# Patient Record
Sex: FEMALE | Race: WHITE | NOT HISPANIC OR LATINO | Employment: UNEMPLOYED | URBAN - METROPOLITAN AREA
[De-identification: names, ages, dates, MRNs, and addresses within clinical notes are randomized per-mention and may not be internally consistent; named-entity substitution may affect disease eponyms.]

---

## 2017-02-01 ENCOUNTER — OFFICE VISIT (OUTPATIENT)
Dept: NEUROLOGY | Facility: CLINIC | Age: 62
End: 2017-02-01

## 2017-02-01 VITALS — RESPIRATION RATE: 16 BRPM | BODY MASS INDEX: 22.5 KG/M2 | WEIGHT: 140 LBS | HEIGHT: 66 IN

## 2017-02-01 DIAGNOSIS — G40.219 PARTIAL SYMPTOMATIC EPILEPSY WITH COMPLEX PARTIAL SEIZURES, INTRACTABLE, WITHOUT STATUS EPILEPTICUS (HCC): Primary | ICD-10-CM

## 2017-02-01 PROCEDURE — 99213 OFFICE O/P EST LOW 20 MIN: CPT | Performed by: PSYCHIATRY & NEUROLOGY

## 2017-02-01 RX ORDER — LEVETIRACETAM 500 MG/1
1000 TABLET ORAL 2 TIMES DAILY
Qty: 120 TABLET | Refills: 11 | Status: SHIPPED | OUTPATIENT
Start: 2017-02-01 | End: 2019-02-20

## 2017-02-01 RX ORDER — SUMATRIPTAN 100 MG/1
TABLET, FILM COATED ORAL
COMMUNITY
Start: 2013-09-04 | End: 2018-03-27

## 2017-02-01 RX ORDER — LEVETIRACETAM 500 MG/1
TABLET ORAL 2 TIMES DAILY
COMMUNITY
Start: 2015-04-02 | End: 2019-02-20 | Stop reason: SDUPTHER

## 2017-02-01 RX ORDER — LEVETIRACETAM 500 MG/1
TABLET ORAL
COMMUNITY
Start: 2017-01-24 | End: 2017-02-01 | Stop reason: SDUPTHER

## 2017-02-01 RX ORDER — SUMATRIPTAN 100 MG/1
TABLET, FILM COATED ORAL
COMMUNITY
Start: 2016-12-23 | End: 2019-02-20

## 2017-02-01 NOTE — PROGRESS NOTES
Subjective   Eladia Israel is a 61 y.o. female.     History of Present Illness   Pt followed since 2011, and previously followed by Dr. Mejia Banerjee for her complex partial seizure disorder as well as cerebral palsy and  stroke with right hemiparesis. She began having seizures around the age of 5 or 6 and has had rare secondarily generalized tonic-clonic seizures. Her partial seizures often begin with a sensation of ringing in her ears and she has had left upper extremity shaking at times with these. A seizure described in  entailed her head turning and feeling stiff and her eyes fluttering/rolling.     Over the years she has had flurries of seizures and has had an admission for status epilepticus to [facility] in  when she had several seizures in one day. An EEG at that time showed left hemisphere spike and slow-wave discharges that were frequent. 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     When I met her she had been on phenytoin for years which was brand name Dilantin 130 milligrams in the morning and 200 mg at night and she was on Actonel and calcium. A bone density scan showed worsening osteoporosis, so we made a plan to change from Dilantin to a newer anticonvulsant and initiated Keppra. Her last visit she was tolerating Keppra XR at 1000 mg at bedtime without difficulty.  We checked a Keppra level which was low so increased to 1500 mg at at bedtime. She reported a brief seizure as above to her primary care practitioner in May that had occurred in April. After I was notified of that, we increase the Keppra XR 2000 mg at bedtime  She reported Keppra better than dilantin, where did have a seizure about once a year where progressed to alt consc and rarely convulsion.  Some problems over time with sleep maintenance insomnia; taking melatonin.. She also  has migraines for which she uses Imitrex.  10/7/14: doing well. Has gained a few pounds, happy about that. Believes she has occasional seizures, eg one last week. Per caregiver with her, just one past 10 months. Very short, but headache afterwards.  On calcium and vitamin D, and getting IV tx for osteoporosis. Overall very happy with LVT. Balance OK.   2/16 reported: Pt thinks still occasional partial seizures, she describes as brief head turns. Caregiver has not seen and is skeptical that these are occurring. Still on LVT 1000mg bid. No longer tired with it. Walking 2 miles/day. Has to be careful, occasional falls. General good health, no seizures. On calcium and vitamin D.     The following portions of the patient's history were reviewed and updated as appropriate: allergies, current medications, past family history, past medical history, past social history, past surgical history and problem list.    Review of Systems   Constitutional: Negative for fever and unexpected weight change.   Respiratory: Negative for cough and shortness of breath.    Cardiovascular: Negative for chest pain.   Neurological: Negative for seizures.       Objective   Physical Exam   Constitutional: She is oriented to person, place, and time. She appears well-developed and well-nourished.   HENT:   Head: Normocephalic and atraumatic.   Eyes: EOM are normal.   Pulmonary/Chest: Effort normal.   Musculoskeletal: Normal range of motion.   Neurological: She is oriented to person, place, and time. She has a normal Finger-Nose-Finger Test and a normal Heel to Coleman Test.   Skin: Skin is warm and dry.   Psychiatric: Her speech is normal.   Nursing note and vitals reviewed.      Neurologic Exam     Mental Status   Oriented to person, place, and time.   Speech: speech is normal   Level of consciousness: alert    Cranial Nerves     CN III, IV, VI   Extraocular motions are normal.     CN V   Facial sensation intact.     CN VII   Facial expression full,  symmetric.     CN IX, X   CN IX normal.     CN XI   CN XI normal.     CN XII   CN XII normal.     Motor Exam   Right arm tone: increased  Left arm tone: normal  Right leg tone: increased  Left leg tone: normal       Right hemiparesis, unchanged.     Gait, Coordination, and Reflexes     Gait  Gait: (hemiparetic)    Coordination   Finger to nose coordination: normal  Heel to shin coordination: normal    Tremor   Intention tremor: absent  Action tremor: absent       Coordination commensurate with strength       Assessment/Plan   Eladia was seen today for seizures.    Diagnoses and all orders for this visit:    Partial symptomatic epilepsy with complex partial seizures, intractable, without status epilepticus    Other orders  -     levETIRAcetam (KEPPRA) 500 MG tablet; Take 2 tablets by mouth 2 (Two) Times a Day.        Discussion/Summary:  Continues to do well with LVT. Discussed prognosis, as well as SEs of past meds, including osteoporosis. Encouraged walking.  15 minute visit, more than 50% spent in discussion as above    Return in about 1 year (around 2/1/2018).

## 2018-03-27 ENCOUNTER — LAB (OUTPATIENT)
Dept: LAB | Facility: HOSPITAL | Age: 63
End: 2018-03-27

## 2018-03-27 ENCOUNTER — OFFICE VISIT (OUTPATIENT)
Dept: NEUROLOGY | Facility: CLINIC | Age: 63
End: 2018-03-27

## 2018-03-27 VITALS
SYSTOLIC BLOOD PRESSURE: 123 MMHG | BODY MASS INDEX: 22.5 KG/M2 | WEIGHT: 140 LBS | DIASTOLIC BLOOD PRESSURE: 72 MMHG | HEIGHT: 66 IN

## 2018-03-27 DIAGNOSIS — G40.209 COMPLEX PARTIAL SEIZURES WITH CONSCIOUSNESS IMPAIRED (HCC): ICD-10-CM

## 2018-03-27 DIAGNOSIS — G40.209 COMPLEX PARTIAL SEIZURES WITH CONSCIOUSNESS IMPAIRED (HCC): Primary | ICD-10-CM

## 2018-03-27 PROCEDURE — 80177 DRUG SCRN QUAN LEVETIRACETAM: CPT

## 2018-03-27 PROCEDURE — 99214 OFFICE O/P EST MOD 30 MIN: CPT | Performed by: PSYCHIATRY & NEUROLOGY

## 2018-03-27 PROCEDURE — 36415 COLL VENOUS BLD VENIPUNCTURE: CPT

## 2018-04-01 LAB — LEVETIRACETAM SERPL-MCNC: 28.7 UG/ML (ref 10–40)

## 2019-02-20 ENCOUNTER — OFFICE VISIT (OUTPATIENT)
Dept: NEUROLOGY | Facility: CLINIC | Age: 64
End: 2019-02-20

## 2019-02-20 VITALS
HEIGHT: 66 IN | SYSTOLIC BLOOD PRESSURE: 160 MMHG | BODY MASS INDEX: 23.14 KG/M2 | HEART RATE: 71 BPM | WEIGHT: 144 LBS | DIASTOLIC BLOOD PRESSURE: 92 MMHG

## 2019-02-20 DIAGNOSIS — G40.219 PARTIAL SYMPTOMATIC EPILEPSY WITH COMPLEX PARTIAL SEIZURES, INTRACTABLE, WITHOUT STATUS EPILEPTICUS (HCC): Primary | ICD-10-CM

## 2019-02-20 DIAGNOSIS — G80.2 SPASTIC HEMIPLEGIC CEREBRAL PALSY (HCC): ICD-10-CM

## 2019-02-20 PROCEDURE — 99212 OFFICE O/P EST SF 10 MIN: CPT | Performed by: PSYCHIATRY & NEUROLOGY

## 2019-02-20 RX ORDER — ALENDRONATE SODIUM 70 MG/1
TABLET ORAL
Refills: 11 | COMMUNITY
Start: 2019-01-28

## 2019-02-20 RX ORDER — LORATADINE 10 MG/1
TABLET ORAL
Refills: 11 | COMMUNITY
Start: 2019-01-29

## 2019-02-20 RX ORDER — IBUPROFEN 800 MG
TABLET ORAL
Refills: 11 | COMMUNITY
Start: 2019-01-29

## 2019-02-20 RX ORDER — LEVETIRACETAM 500 MG/1
2000 TABLET, EXTENDED RELEASE ORAL NIGHTLY
Qty: 120 TABLET | Refills: 11 | Status: SHIPPED | OUTPATIENT
Start: 2019-02-20 | End: 2020-02-18

## 2019-02-20 RX ORDER — LEVETIRACETAM 500 MG/1
500 TABLET ORAL 2 TIMES DAILY
Qty: 60 TABLET | Refills: 11 | Status: SHIPPED | OUTPATIENT
Start: 2019-02-20 | End: 2019-02-20

## 2019-02-20 NOTE — PROGRESS NOTES
Subjective   Eladia Israel is a 63 y.o. female.     Chief Complaint   Patient presents with   • Seizures     Fup       History of Present Illness     Pt followed since , and previously followed by Dr. Mejia Banerjee for complex partial seizure disorder as well as cerebral palsy and  stroke with RHP. She began having seizures around the age of 5 or 6 and has had rare secondarily generalized tonic-clonic seizures. Her partial seizures often begin with a sensation of ringing in her ears and she has had left upper extremity shaking at times with these. One seizure described as her head turning and feeling stiff and her eyes fluttering/rolling.     Over the years she has had flurries of seizures and has had an admission for status epilepticus to [facility] in  when she had several seizures in one day. An EEG at that time showed left hemisphere spike and slow-wave discharges that were frequent. 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     When I met her she had been on brand name Dilantin 130 milligrams in the morning and 200 mg at night and she was on Actonel and calcium. A bone density scan showed worsening osteoporosis, so planned change to Keppra. On dilantin had a seizure about once a year with progression to alt consc and rarely convulsion. Keppra XR gradually increased to 2000 mg hs 2/2 breakthrough seizures.    Some problems over time with sleep maintenance insomnia; taking melatonin.. She also has migraines for which she uses Imitrex.  10/14: doing well. Has gained a few pounds, happy about that. Believes she has occasional seizures, eg one last week. Per caregiver with her, just one past 10 months. Very short, but headache afterwards.  On calcium and vitamin D, and getting IV tx for osteoporosis.    reported: Pt thinks still occasional partial seizures, she  "describes as brief head turns. Caregiver has not seen and is skeptical that these are occurring.   2/17: Still on LVT 1000mg bid. No longer tired with it. Walking 2 miles/day. Has to be careful, occasional falls. General good health, no seizures. On calcium and vitamin D.   3/18: She had done well until 2 weeks ago when she had a \"big\" seizure while in class (no description available). She has no memory of the event but was told about it.  A week ago she believes she had 2 small seizures during which she remained aware, one in the night and 1 in the afternoon, but she did not report these to the medical office.  There was  some question of whether she had medication changes before or after that, but discussed with the nurse practitioner at [facility], and there has been no recent change in her Keppra dose. Blood level was 28, planned change only if further seizures.  Today: no seizures at all. Never misses doses. Recently had bone density scan, has not yet heard results.        Allergies   Allergen Reactions   • No Known Drug Allergy        Current Outpatient Medications on File Prior to Visit   Medication Sig Dispense Refill   • ALLERGY RELIEF 10 MG tablet   11   • Calcium Carbonate-Vitamin D (CALCIUM-VITAMIN D) 500-200 MG-UNIT tablet per tablet   11   • Cholecalciferol (VITAMIN D3) 400 units capsule   11   • TYLENOL 325 MG capsule   11   • [DISCONTINUED] levETIRAcetam (KEPPRA) 500 MG tablet Take  by mouth 2 (Two) Times a Day.     • alendronate (FOSAMAX) 70 MG tablet   11   • [DISCONTINUED] levETIRAcetam (KEPPRA) 500 MG tablet Take 2 tablets by mouth 2 (Two) Times a Day. 120 tablet 11   • [DISCONTINUED] SUMAtriptan (IMITREX) 100 MG tablet        No current facility-administered medications on file prior to visit.        No past medical history on file.    No past surgical history on file.    Social History     Socioeconomic History   • Marital status: Single     Spouse name: Not on file   • Number of " "children: Not on file   • Years of education: Not on file   • Highest education level: Not on file   Social Needs   • Financial resource strain: Not on file   • Food insecurity - worry: Not on file   • Food insecurity - inability: Not on file   • Transportation needs - medical: Not on file   • Transportation needs - non-medical: Not on file   Occupational History   • Not on file   Tobacco Use   • Smoking status: Never Smoker   Substance and Sexual Activity   • Alcohol use: No   • Drug use: Not on file   • Sexual activity: Not on file   Other Topics Concern   • Not on file   Social History Narrative   • Not on file       Review of Systems   Constitutional: Negative for fever and unexpected weight change.   Respiratory: Negative for cough and shortness of breath.    Cardiovascular: Negative for chest pain.       Objective   Blood pressure 160/92, pulse 71, height 167 cm (65.75\"), weight 65.3 kg (144 lb).   Recheck: 150/95    Physical Exam   Constitutional: She appears well-nourished. No distress.   HENT:   Head: Normocephalic and atraumatic.   Pulmonary/Chest: Effort normal.   Skin: Skin is warm and dry.   Psychiatric: She has a normal mood and affect.   Nursing note and vitals reviewed.      Neurologic Exam   Mental Status   Oriented to person, place, and time.   Speech: speech is mildly slurred, unchanged   Level of consciousness: alert     Cranial Nerves      CN III, IV, VI   Extraocular motions are normal.      CN V   Facial sensation intact.      CN VII   Facial expression full, symmetric.      CN IX, X   CN IX normal.      CN XI   CN XI normal.      CN XII   CN XII normal.      Motor Exam   Right arm tone: increased/spasticity  Left arm tone: normal  Right leg tone: increased  Left leg tone: normal       Right hemiparesis, unchanged.      Gait, Coordination, and Reflexes      Gait  Gait: (hemiparetic)     Coordination   Finger to nose coordination: normal  Heel to shin coordination: normal     Tremor   Intention " tremor: absent  Action tremor: absent       Coordination commensurate with strength           Assessment/Plan     Eladia was seen today for seizures.    Diagnoses and all orders for this visit:    Partial symptomatic epilepsy with complex partial seizures, intractable, without status epilepticus (CMS/HCC)    Spastic hemiplegic cerebral palsy (CMS/HCC)    Other orders  -     Discontinue: levETIRAcetam (KEPPRA) 500 MG tablet; Take 1 tablet by mouth 2 (Two) Times a Day.  -     levETIRAcetam XR (KEPPRA XR) 500 MG 24 hr tablet; Take 4 tablets by mouth Every Night.          Discussion/Summary:  Check BPs at clinic at residence. Discussed long term consequences of HTN. Also encouraged exercise.  No change in LVT XR 2000mg hs. Still has rare breakthrough seizure, but better control than in past, pt pleased with current control.     Return in about 1 year (around 2/20/2020).         [phone]

## 2019-02-21 ENCOUNTER — TELEPHONE (OUTPATIENT)
Dept: NEUROLOGY | Facility: CLINIC | Age: 64
End: 2019-02-21

## 2020-02-18 RX ORDER — LEVETIRACETAM 500 MG/1
2000 TABLET, EXTENDED RELEASE ORAL NIGHTLY
Qty: 120 TABLET | Refills: 11 | Status: SHIPPED | OUTPATIENT
Start: 2020-02-18 | End: 2020-02-20 | Stop reason: SDUPTHER

## 2020-02-20 ENCOUNTER — OFFICE VISIT (OUTPATIENT)
Dept: NEUROLOGY | Facility: CLINIC | Age: 65
End: 2020-02-20

## 2020-02-20 VITALS
WEIGHT: 142 LBS | DIASTOLIC BLOOD PRESSURE: 88 MMHG | HEIGHT: 66 IN | SYSTOLIC BLOOD PRESSURE: 162 MMHG | BODY MASS INDEX: 22.82 KG/M2

## 2020-02-20 DIAGNOSIS — G80.2 SPASTIC HEMIPLEGIC CEREBRAL PALSY (HCC): ICD-10-CM

## 2020-02-20 DIAGNOSIS — G40.219 PARTIAL SYMPTOMATIC EPILEPSY WITH COMPLEX PARTIAL SEIZURES, INTRACTABLE, WITHOUT STATUS EPILEPTICUS (HCC): Primary | ICD-10-CM

## 2020-02-20 PROCEDURE — 99214 OFFICE O/P EST MOD 30 MIN: CPT | Performed by: PSYCHIATRY & NEUROLOGY

## 2020-02-20 RX ORDER — LISINOPRIL 10 MG/1
TABLET ORAL
COMMUNITY
Start: 2020-01-09

## 2020-02-20 RX ORDER — LEVETIRACETAM 500 MG/1
2000 TABLET, EXTENDED RELEASE ORAL NIGHTLY
Qty: 120 TABLET | Refills: 11 | Status: SHIPPED | OUTPATIENT
Start: 2020-02-20 | End: 2021-02-15 | Stop reason: SDUPTHER

## 2020-02-20 NOTE — PROGRESS NOTES
Subjective:    CC: Eladia Israel is in clinic today for follow up for epilepsy.    HPI:  Problem history:  Pt followed since , and previously followed by Dr. Mejia Banerjee for complex partial seizure disorder as well as cerebral palsy and  stroke with RHP. She began having seizures around the age of 5 or 6 and has had rare secondarily generalized tonic-clonic seizures. Her partial seizures often begin with a sensation of ringing in her ears and she has had left upper extremity shaking at times with these. One seizure described as her head turning and feeling stiff and her eyes fluttering/rolling.     Over the years she has had flurries of seizures and has had an admission for status epilepticus to [facility] in  when she had several seizures in one day. An EEG at that time showed left hemisphere spike and slow-wave discharges that were frequent. 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     When I met her she had been on brand name Dilantin 130 milligrams in the morning and 200 mg at night and she was on Actonel and calcium. A bone density scan showed worsening osteoporosis, so planned change to Keppra. On dilantin had a seizure about once a year with progression to alt consc and rarely convulsion. Keppra XR gradually increased to 2000 mg hs 2/2 breakthrough seizures.    Some problems over time with sleep maintenance insomnia; taking melatonin.. She also has migraines for which she uses Imitrex.  10/14: doing well. Has gained a few pounds, happy about that. Believes she has occasional seizures, eg one last week. Per caregiver with her, just one past 10 months. Very short, but headache afterwards.  On calcium and vitamin D, and getting IV tx for osteoporosis.    reported: Pt thinks still occasional partial seizures, she describes as brief head turns. Caregiver has not  "seen and is skeptical that these are occurring.   2/17: Still on LVT 1000mg bid. No longer tired with it. Walking 2 miles/day. Has to be careful, occasional falls. General good health, no seizures. On calcium and vitamin D.   3/18: She had done well until 2 weeks ago when she had a \"big\" seizure while in class (no description available). She has no memory of the event but was told about it.  A week ago she believes she had 2 small seizures during which she remained aware, one in the night and 1 in the afternoon, but she did not report these to the medical office.  There was  some question of whether she had medication changes before or after that, but discussed with the nurse practitioner at [facility], and there has been no recent change in her Keppra dose. Blood level was 28, planned change only if further seizures.  Today: no seizures at all. Never misses doses. Recently had bone density scan, has not yet heard results.      Follow-up 2/20/2020:  She is in clinic for regular follow-up.  Since her last visit, she has not had any breakthrough seizures.  She continues to take Keppra  mg tablet, 4 tablets at bedtime.  She is tolerating the medication well and has done excellent with seizure control.      The following portions of the patient's history were reviewed and updated as of 02/20/2020: allergies, social history and problem list.       Current Outpatient Medications:   •  alendronate (FOSAMAX) 70 MG tablet, , Disp: , Rfl: 11  •  ALLERGY RELIEF 10 MG tablet, , Disp: , Rfl: 11  •  Calcium Carbonate-Vitamin D (CALCIUM-VITAMIN D) 500-200 MG-UNIT tablet per tablet, , Disp: , Rfl: 11  •  Cholecalciferol (VITAMIN D3) 400 units capsule, , Disp: , Rfl: 11  •  levETIRAcetam XR (KEPPRA XR) 500 MG 24 hr tablet, Take 4 tablets by mouth Every Night., Disp: 120 tablet, Rfl: 11  •  lisinopril (PRINIVIL,ZESTRIL) 10 MG tablet, , Disp: , Rfl:   •  TYLENOL 325 MG capsule, , Disp: , Rfl: 11   History reviewed. No " "pertinent past medical history.   History reviewed. No pertinent surgical history.   Family History   Problem Relation Age of Onset   • Diabetes Other    • Hypertension Other    • Stroke Other         Review of Systems   All other systems reviewed and are negative.    Objective:    /88   Ht 167 cm (65.75\")   Wt 64.4 kg (142 lb)   BMI 23.09 kg/m²     Constitutional: She appears well-nourished. No distress.   HENT:   Head: Normocephalic and atraumatic.   Pulmonary/Chest: Effort normal.   Skin: Skin is warm and dry.   Psychiatric: She has a normal mood and affect.   Nursing note and vitals reviewed.      Neurologic Exam   Mental Status   Oriented to person, place, and time.   Speech: speech is mildly slurred, unchanged   Level of consciousness: alert     Cranial Nerves      CN III, IV, VI   Extraocular motions are normal.      CN V   Facial sensation intact.      CN VII   Facial expression full, symmetric.      CN IX, X   CN IX normal.      CN XI   CN XI normal.      CN XII   CN XII normal.      Motor Exam   Right arm tone: increased/spasticity  Left arm tone: normal  Right leg tone: increased  Left leg tone: normal       Right hemiparesis, unchanged.      Gait, Coordination, and Reflexes      Gait  Gait: (hemiparetic)     Coordination   Finger to nose coordination: normal  Heel to shin coordination: normal     Tremor   Intention tremor: absent  Action tremor: absent       Coordination commensurate with strength         Assessment and Plan:  1. Partial symptomatic epilepsy with complex partial seizures, intractable, without status epilepticus (CMS/HCC)  2. Spastic hemiplegic cerebral palsy (CMS/HCC)  -Seizures are under excellent control with Keppra  mg tablet, 4 tablets ( 2000 mg )  at bedtime.  No side effects.  Continue with current dose and I will see her back in 1 year for follow-up.       I spent 25 minutes face to face with the patient and spent more than 50% of this time  in management, " instructions and education regarding above mentioned diagnosis and also on counseling and discussing about taking medication regularly, possible side effects with medication use, importance of good sleep hygiene, good hydration and regular exercise.    Return in about 1 year (around 2/20/2021).

## 2020-03-31 ENCOUNTER — PRIOR AUTHORIZATION (OUTPATIENT)
Dept: NEUROLOGY | Facility: CLINIC | Age: 65
End: 2020-03-31

## 2021-01-26 ENCOUNTER — TELEPHONE (OUTPATIENT)
Dept: NEUROLOGY | Facility: CLINIC | Age: 66
End: 2021-01-26

## 2021-01-26 NOTE — TELEPHONE ENCOUNTER
PT'S MOTHER, JUAN LUIS CONLEY, HAS CALLED STATING THAT PT IS ON LOCKDOWN AT [employer] AND WILL BE UNABLE TO ATTEND IN-OFFICE F/U ON 2/17/21. SHE IS WONDERING IF APPT CAN BE RESCHEDULED FURTHER OUT AND MEDICATIONS BE REFILLED UNTIL TIME OF APPT. SHE STATES SHE IS UNSURE WHEN APPT WOULD NEED TO BE RESCHEDULED FOR, AS SHE DOES NOT KNOW HOW LONG THE RESTRICTIONS WILL BE IN PLACE OR WHEN IT WILL BE SAFE. I INFORMED HER A TELEHEALTH VISIT MAY BE NECESSARY TO REFILL PRESCRIPTIONS.     PT'S DAUGHTER CAN BE REACHED AT [phone].  [facility] CAN BE REACHED AT [phone]. PLEASE CONSULT WITH PT'S DAUGHTER AND [facility] ABOUT DECISION REGARDING APPT.    PLEASE REVIEW AND ADVISE.

## 2021-02-15 NOTE — TELEPHONE ENCOUNTER
Provider: DR. FRIAS  Caller: [employer]ST [employer] PT'S MOTHER  Phone Number: [phone]      Reason for call:    JUAN LUIS CONLEY PT'S MOTHER IS CALLING STATING THAT [employer] [employer] IS ON LOCK DOWN DUE TO THEY HAD SEVERAL CASES OF COVID AND PT IS NOT ABLE TO COME TO THE APPT ON 2-. PT NEEDS A REFILL FOR THE levETIRAcetam XR (KEPPRA XR) 500 MG 24 hr tablet.    SHE WILL CALL BACK ONCE PT IS OUT OF QUARRINTINE TO RESCHEDULE APPT.

## 2021-02-17 RX ORDER — LEVETIRACETAM 500 MG/1
2000 TABLET, EXTENDED RELEASE ORAL NIGHTLY
Qty: 120 TABLET | Refills: 11 | Status: SHIPPED | OUTPATIENT
Start: 2021-02-17 | End: 2021-09-20 | Stop reason: SDUPTHER

## 2021-09-20 ENCOUNTER — OFFICE VISIT (OUTPATIENT)
Dept: NEUROLOGY | Facility: CLINIC | Age: 66
End: 2021-09-20

## 2021-09-20 VITALS — BODY MASS INDEX: 22.24 KG/M2 | HEART RATE: 93 BPM | OXYGEN SATURATION: 100 % | HEIGHT: 67 IN

## 2021-09-20 DIAGNOSIS — G80.2 SPASTIC HEMIPLEGIC CEREBRAL PALSY (HCC): ICD-10-CM

## 2021-09-20 DIAGNOSIS — G40.219 PARTIAL SYMPTOMATIC EPILEPSY WITH COMPLEX PARTIAL SEIZURES, INTRACTABLE, WITHOUT STATUS EPILEPTICUS (HCC): Primary | ICD-10-CM

## 2021-09-20 PROCEDURE — 99214 OFFICE O/P EST MOD 30 MIN: CPT | Performed by: PSYCHIATRY & NEUROLOGY

## 2021-09-20 RX ORDER — LEVETIRACETAM 500 MG/1
2000 TABLET, EXTENDED RELEASE ORAL NIGHTLY
Qty: 120 TABLET | Refills: 11 | Status: SHIPPED | OUTPATIENT
Start: 2021-09-20 | End: 2022-09-06

## 2021-09-20 NOTE — PROGRESS NOTES
Subjective:    CC: Eladia Israel is in clinic today for follow up for epilepsy.    HPI:  Problem history:  Pt followed since , and previously followed by Dr. Mejia Banerjee for complex partial seizure disorder as well as cerebral palsy and  stroke with RHP. She began having seizures around the age of 5 or 6 and has had rare secondarily generalized tonic-clonic seizures. Her partial seizures often begin with a sensation of ringing in her ears and she has had left upper extremity shaking at times with these. One seizure described as her head turning and feeling stiff and her eyes fluttering/rolling.     Over the years she has had flurries of seizures and has had an admission for status epilepticus to [facility] in  when she had several seizures in one day. An EEG at that time showed left hemisphere spike and slow-wave discharges that were frequent. 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     When I met her she had been on brand name Dilantin 130 milligrams in the morning and 200 mg at night and she was on Actonel and calcium. A bone density scan showed worsening osteoporosis, so planned change to Keppra. On dilantin had a seizure about once a year with progression to alt consc and rarely convulsion. Keppra XR gradually increased to 2000 mg hs 2/2 breakthrough seizures.    Some problems over time with sleep maintenance insomnia; taking melatonin.. She also has migraines for which she uses Imitrex.  10/14: doing well. Has gained a few pounds, happy about that. Believes she has occasional seizures, eg one last week. Per caregiver with her, just one past 10 months. Very short, but headache afterwards.  On calcium and vitamin D, and getting IV tx for osteoporosis.    reported: Pt thinks still occasional partial seizures, she describes as brief head turns. Caregiver has not  "seen and is skeptical that these are occurring.   2/17: Still on LVT 1000mg bid. No longer tired with it. Walking 2 miles/day. Has to be careful, occasional falls. General good health, no seizures. On calcium and vitamin D.   3/18: She had done well until 2 weeks ago when she had a \"big\" seizure while in class (no description available). She has no memory of the event but was told about it.  A week ago she believes she had 2 small seizures during which she remained aware, one in the night and 1 in the afternoon, but she did not report these to the medical office.  There was  some question of whether she had medication changes before or after that, but discussed with the nurse practitioner at [facility], and there has been no recent change in her Keppra dose. Blood level was 28, planned change only if further seizures.  Today: no seizures at all. Never misses doses. Recently had bone density scan, has not yet heard results.      Follow-up 2/20/2020:  She is in clinic for regular follow-up.  Since her last visit, she has not had any breakthrough seizures.  She continues to take Keppra  mg tablet, 4 tablets at bedtime.  She is tolerating the medication well and has done excellent with seizure control.    Follow-up: 9/20/2021: She is in clinic for regular follow-up.  Since her last visit in February, she has not had any more breakthrough seizures.  She continues to take Keppra  mg tablet, 4 tablets at bedtime.    The following portions of the patient's history were reviewed and updated as of 09/20/2021: allergies, social history and problem list.       Current Outpatient Medications:   •  alendronate (FOSAMAX) 70 MG tablet, , Disp: , Rfl: 11  •  ALLERGY RELIEF 10 MG tablet, , Disp: , Rfl: 11  •  Calcium Carbonate-Vitamin D (CALCIUM-VITAMIN D) 500-200 MG-UNIT tablet per tablet, , Disp: , Rfl: 11  •  Cholecalciferol (VITAMIN D3) 400 units capsule, , Disp: , Rfl: 11  •  levETIRAcetam XR (KEPPRA XR) 500 " "MG 24 hr tablet, Take 4 tablets by mouth Every Night., Disp: 120 tablet, Rfl: 11  •  lisinopril (PRINIVIL,ZESTRIL) 10 MG tablet, , Disp: , Rfl:   •  TYLENOL 325 MG capsule, , Disp: , Rfl: 11   Past Medical History:   Diagnosis Date   • Seizures (CMS/HCC)       History reviewed. No pertinent surgical history.   Family History   Problem Relation Age of Onset   • Diabetes Other    • Hypertension Other    • Stroke Other         Review of Systems   Constitutional: Negative.    HENT: Negative.    Eyes: Negative.    Respiratory: Negative.    Cardiovascular: Negative.    Gastrointestinal: Negative.    Endocrine: Negative.    Genitourinary: Negative.    Musculoskeletal: Negative.    Skin: Negative.    Allergic/Immunologic: Negative.    Neurological: Positive for seizures.   Hematological: Negative.    Psychiatric/Behavioral: Negative.      Objective:    Pulse 93   Ht 170.2 cm (67\")   SpO2 100%   BMI 22.24 kg/m²     Neurology Exam:    Constitutional: She appears well-nourished. No distress.   HENT:   Head: Normocephalic and atraumatic.   Pulmonary/Chest: Effort normal.   Skin: Skin is warm and dry.   Psychiatric: She has a normal mood and affect.   Nursing note and vitals reviewed.      Neurologic Exam   Mental Status   Oriented to person, place, and time.   Speech: speech is mildly slurred, unchanged   Level of consciousness: alert     Cranial Nerves      CN III, IV, VI   Extraocular motions are normal.      CN V   Facial sensation intact.      CN VII   Facial expression full, symmetric.      CN IX, X   CN IX normal.      CN XI   CN XI normal.      CN XII   CN XII normal.      Motor Exam   Right arm tone: increased/spasticity  Left arm tone: normal  Right leg tone: increased  Left leg tone: normal       Right hemiparesis, unchanged.      Gait, Coordination, and Reflexes      Gait  Gait: (hemiparetic)     Coordination   Finger to nose coordination: normal  Heel to shin coordination: normal     Tremor   Intention tremor: " absent  Action tremor: absent       Coordination commensurate with strength       Assessment and Plan:  1. Partial symptomatic epilepsy with complex partial seizures, intractable, without status epilepticus (CMS/HCC)  2. Spastic hemiplegic cerebral palsy (CMS/HCC)  -Seizures remain under excellent control with Keppra 2000 mg daily dose.  She is tolerating the medication well without any side effects.  It will be continued at the same dose and I will see her back in clinic in 1 year for follow-up.     I spent 30 minutes face to face with the patient and spent more than 50% of this time  in management, instructions and education regarding above mentioned diagnosis and also on counseling and discussing about taking medication regularly, possible side effects with medication use, importance of good sleep hygiene, good hydration and regular exercise.    Return in about 1 year (around 9/20/2022).

## 2022-09-06 RX ORDER — LEVETIRACETAM 500 MG/1
2000 TABLET, EXTENDED RELEASE ORAL NIGHTLY
Qty: 112 TABLET | Refills: 0 | Status: SHIPPED | OUTPATIENT
Start: 2022-09-06 | End: 2022-10-28

## 2022-10-31 RX ORDER — LEVETIRACETAM 500 MG/1
2000 TABLET, EXTENDED RELEASE ORAL NIGHTLY
Qty: 120 TABLET | Refills: 11 | Status: SHIPPED | OUTPATIENT
Start: 2022-10-31 | End: 2022-11-17 | Stop reason: SDUPTHER

## 2022-11-17 ENCOUNTER — OFFICE VISIT (OUTPATIENT)
Dept: NEUROLOGY | Facility: CLINIC | Age: 67
End: 2022-11-17

## 2022-11-17 VITALS
HEART RATE: 80 BPM | OXYGEN SATURATION: 97 % | WEIGHT: 146 LBS | BODY MASS INDEX: 22.91 KG/M2 | DIASTOLIC BLOOD PRESSURE: 84 MMHG | HEIGHT: 67 IN | SYSTOLIC BLOOD PRESSURE: 122 MMHG

## 2022-11-17 DIAGNOSIS — G40.219 PARTIAL SYMPTOMATIC EPILEPSY WITH COMPLEX PARTIAL SEIZURES, INTRACTABLE, WITHOUT STATUS EPILEPTICUS: Primary | ICD-10-CM

## 2022-11-17 DIAGNOSIS — G80.2 SPASTIC HEMIPLEGIC CEREBRAL PALSY: ICD-10-CM

## 2022-11-17 PROCEDURE — 99214 OFFICE O/P EST MOD 30 MIN: CPT | Performed by: PSYCHIATRY & NEUROLOGY

## 2022-11-17 RX ORDER — LEVETIRACETAM 500 MG/1
2000 TABLET, EXTENDED RELEASE ORAL NIGHTLY
Qty: 360 TABLET | Refills: 4 | Status: SHIPPED | OUTPATIENT
Start: 2022-11-17 | End: 2023-03-17

## 2023-03-17 ENCOUNTER — TELEPHONE (OUTPATIENT)
Dept: NEUROLOGY | Facility: CLINIC | Age: 68
End: 2023-03-17
Payer: MEDICARE

## 2023-03-17 RX ORDER — LEVETIRACETAM 750 MG/1
3000 TABLET, EXTENDED RELEASE ORAL DAILY
Qty: 360 TABLET | Refills: 1 | Status: SHIPPED | OUTPATIENT
Start: 2023-03-17 | End: 2023-03-21 | Stop reason: DRUGHIGH

## 2023-03-17 NOTE — TELEPHONE ENCOUNTER
Provider: MINAL FRIAS MD    Caller: JUAN LUIS    Relationship to Patient: MTR    Phone Number: [phone]    Reason for Call:  [employer] PT IS HAVING BRIEF SEIZURES.  [employer] CALLED YESTERDAY (3-16-23) IN THE AFTERNOON.    STATED THE SCHOOL IS WANTING TO KNOW IF THE PROVIDER WANTS TO UP THE KEPPRA DOSAGE.   STATED THEY HAVE NOT TOOK PT TO THE HOSPITAL, THAT [facility]TR KNOW OF.        When was the patient last seen: 11-17-22    PLEASE CALL & ADVISE

## 2023-03-20 ENCOUNTER — TELEPHONE (OUTPATIENT)
Dept: NEUROLOGY | Facility: CLINIC | Age: 68
End: 2023-03-20
Payer: MEDICARE

## 2023-03-20 NOTE — TELEPHONE ENCOUNTER
SENT IN LEVETIRACETAM 750 MG. PATIENT IS CURRENTLY  MG AND WANT TO MAKE SURE THAT THIS DOSAGE IS REALLY WHAT DR FRIAS WANTS.

## 2023-03-21 RX ORDER — LEVETIRACETAM 500 MG/1
2000 TABLET, EXTENDED RELEASE ORAL NIGHTLY
Qty: 360 TABLET | Refills: 1 | Status: SHIPPED | OUTPATIENT
Start: 2023-03-21 | End: 2023-06-19

## 2023-03-21 NOTE — TELEPHONE ENCOUNTER
Spoke to Randi and she states patient has been stable on 500mg dose 4 times nightly and believes her seizure incident was related to patient being severely dehydrated. Randi would like to maintain previous therapy for now and will plan on making future dose adjustments if any issues arise.

## 2023-04-24 ENCOUNTER — TELEPHONE (OUTPATIENT)
Dept: NEUROLOGY | Facility: CLINIC | Age: 68
End: 2023-04-24
Payer: MEDICARE

## 2023-04-24 NOTE — TELEPHONE ENCOUNTER
Kezia with Home care called asking if patient had a visit her on 2/20th and I notified no she saw us 11/20 and has a follow up this November.    They like to keep up with her so have asked for the most recent OV note from 11/20.    Faxed to her ATTN: Kezia @ Fax# [phone]

## 2023-10-31 RX ORDER — LEVETIRACETAM 500 MG/1
2000 TABLET, EXTENDED RELEASE ORAL NIGHTLY
Qty: 360 TABLET | Refills: 1 | Status: CANCELLED | OUTPATIENT
Start: 2023-10-31 | End: 2024-01-29

## 2023-10-31 RX ORDER — LEVETIRACETAM 500 MG/1
2000 TABLET, EXTENDED RELEASE ORAL NIGHTLY
Qty: 120 TABLET | Refills: 0 | Status: SHIPPED | OUTPATIENT
Start: 2023-10-31

## 2023-10-31 NOTE — TELEPHONE ENCOUNTER
Caller: [facility] [address] [facility] - [phone] [facility] [phone] FX    Relationship: Pharmacy    Best call back number: [phone]    Requested Prescriptions:   Requested Prescriptions     Pending Prescriptions Disp Refills    levETIRAcetam XR (KEPPRA XR) 500 MG tablet 360 tablet 1     Sig: Take 4 tablets by mouth Every Night for 90 days.        Pharmacy where request should be sent: [place] [id number] [facility] - [phone] [facility] [phone] FX     Last office visit with prescribing clinician: 11/17/2022   Last telemedicine visit with prescribing clinician: Visit date not found   Next office visit with prescribing clinician: 11/20/2023     Additional details provided by patient: N/A    Does the patient have less than a 3 day supply:  [] Yes  [x] No    Would you like a call back once the refill request has been completed: [] Yes [x] No    If the office needs to give you a call back, can they leave a voicemail: [] Yes [x] No    Bernadine Wilcox Rep   10/31/23 16:01 EDT

## 2023-10-31 NOTE — TELEPHONE ENCOUNTER
Rx Refill Note  Requested Prescriptions     Pending Prescriptions Disp Refills    levETIRAcetam XR (KEPPRA XR) 500 MG tablet [Pharmacy Med Name: LEVETIRACETAM  MG  Tablet] 112 tablet 1     Sig: TAKE 4 TABLETS BY MOUTH EVERY NIGHT      Last filled:  3/21/23 90 days with 1 refill. Pt being seen next month, sending enough to get to next appt     Last office visit with prescribing clinician: 11/17/2022      Next office visit with prescribing clinician: 11/20/2023     Darrel Goldstein MA  10/31/23, 16:22 EDT

## 2023-11-20 ENCOUNTER — OFFICE VISIT (OUTPATIENT)
Dept: NEUROLOGY | Facility: CLINIC | Age: 68
End: 2023-11-20
Payer: MEDICARE

## 2023-11-20 VITALS
HEIGHT: 67 IN | HEART RATE: 91 BPM | DIASTOLIC BLOOD PRESSURE: 100 MMHG | WEIGHT: 147 LBS | SYSTOLIC BLOOD PRESSURE: 162 MMHG | OXYGEN SATURATION: 98 % | BODY MASS INDEX: 23.07 KG/M2

## 2023-11-20 DIAGNOSIS — G40.219 PARTIAL SYMPTOMATIC EPILEPSY WITH COMPLEX PARTIAL SEIZURES, INTRACTABLE, WITHOUT STATUS EPILEPTICUS: ICD-10-CM

## 2023-11-20 DIAGNOSIS — G80.2 SPASTIC HEMIPLEGIC CEREBRAL PALSY: Primary | ICD-10-CM

## 2023-11-20 PROCEDURE — 99214 OFFICE O/P EST MOD 30 MIN: CPT | Performed by: PSYCHIATRY & NEUROLOGY

## 2023-11-20 PROCEDURE — 1160F RVW MEDS BY RX/DR IN RCRD: CPT | Performed by: PSYCHIATRY & NEUROLOGY

## 2023-11-20 PROCEDURE — 1159F MED LIST DOCD IN RCRD: CPT | Performed by: PSYCHIATRY & NEUROLOGY

## 2023-11-20 NOTE — PROGRESS NOTES
Subjective:    CC: Eladia Israel is in clinic today for follow up for history of spastic cerebral palsy, epilepsy.    HPI:    Problem history:  Pt followed since , and previously followed by Dr. Mejia Banerjee for complex partial seizure disorder as well as cerebral palsy and  stroke with RHP. She began having seizures around the age of 5 or 6 and has had rare secondarily generalized tonic-clonic seizures. Her partial seizures often begin with a sensation of ringing in her ears and she has had left upper extremity shaking at times with these. One seizure described as her head turning and feeling stiff and her eyes fluttering/rolling.     Over the years she has had flurries of seizures and has had an admission for status epilepticus to [facility] in  when she had several seizures in one day. An EEG at that time showed left hemisphere spike and slow-wave discharges that were frequent. 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     When I met her she had been on brand name Dilantin 130 milligrams in the morning and 200 mg at night and she was on Actonel and calcium. A bone density scan showed worsening osteoporosis, so planned change to Keppra. On dilantin had a seizure about once a year with progression to alt consc and rarely convulsion. Keppra XR gradually increased to 2000 mg hs 2/2 breakthrough seizures.    Some problems over time with sleep maintenance insomnia; taking melatonin.. She also has migraines for which she uses Imitrex.  10/14: doing well. Has gained a few pounds, happy about that. Believes she has occasional seizures, eg one last week. Per caregiver with her, just one past 10 months. Very short, but headache afterwards.  On calcium and vitamin D, and getting IV tx for osteoporosis.    reported: Pt thinks still occasional partial seizures, she describes as  "brief head turns. Caregiver has not seen and is skeptical that these are occurring.   2/17: Still on LVT 1000mg bid. No longer tired with it. Walking 2 miles/day. Has to be careful, occasional falls. General good health, no seizures. On calcium and vitamin D.   3/18: She had done well until 2 weeks ago when she had a \"big\" seizure while in class (no description available). She has no memory of the event but was told about it.  A week ago she believes she had 2 small seizures during which she remained aware, one in the night and 1 in the afternoon, but she did not report these to the medical office.  There was  some question of whether she had medication changes before or after that, but discussed with the nurse practitioner at [facility], and there has been no recent change in her Keppra dose. Blood level was 28, planned change only if further seizures.  Today: no seizures at all. Never misses doses. Recently had bone density scan, has not yet heard results.      Follow-up 2/20/2020:  She is in clinic for regular follow-up.  Since her last visit, she has not had any breakthrough seizures.  She continues to take Keppra  mg tablet, 4 tablets at bedtime.  She is tolerating the medication well and has done excellent with seizure control.    Follow-up: 9/20/2021: She is in clinic for regular follow-up.  Since her last visit in February, she has not had any more breakthrough seizures.  She continues to take Keppra  mg tablet, 4 tablets at bedtime.    Follow-up: 11/17/2022: She is in clinic for regular follow-up.  Since her last visit 1 year ago, she has not had any more breakthrough seizures.  She is tolerating Keppra 2000 mg daily well without any side effects.    Follow-up: 11/20/2023: She is in clinic for regular follow-up.  Since her last visit in November 2022, she reports that she may have had couple of small seizures but is not 100% sure.  She has been taking Keppra  mg, 4 tablets at " "bedtime.  She denies any side effects with this dose.    The following portions of the patient's history were reviewed and updated as of 11/20/2023: allergies, social history, and problem list.       Current Outpatient Medications:     alendronate (FOSAMAX) 70 MG tablet, Take 1 tablet by mouth Every 7 (Seven) Days., Disp: , Rfl: 11    ALLERGY RELIEF 10 MG tablet, , Disp: , Rfl: 11    Calcium Carbonate-Vitamin D (CALCIUM-VITAMIN D) 500-200 MG-UNIT tablet per tablet, , Disp: , Rfl: 11    Cholecalciferol (VITAMIN D3) 400 units capsule, , Disp: , Rfl: 11    levETIRAcetam XR (KEPPRA XR) 1000 MG tablet sustained-release 24 hour tablet, Take 2 tablets by mouth Every Night for 90 days., Disp: 180 tablet, Rfl: 3    lisinopril (PRINIVIL,ZESTRIL) 10 MG tablet, , Disp: , Rfl:     TYLENOL 325 MG capsule, , Disp: , Rfl: 11   Past Medical History:   Diagnosis Date    Seizures       No past surgical history on file.   Family History   Problem Relation Age of Onset    Diabetes Other     Hypertension Other     Stroke Other         Review of Systems  Objective:    /100 Comment: 1st try 158/92  Pulse 91   Ht 170.2 cm (67\") Comment: self reported  Wt 66.7 kg (147 lb) Comment: self reported  SpO2 98%   BMI 23.02 kg/m²     Neurology Exam:  Mental Status   Oriented to person, place, and time.   Speech: speech is mildly slurred, unchanged   Level of consciousness: alert     Cranial Nerves      CN III, IV, VI   Extraocular motions are normal.      CN V   Facial sensation intact.      CN VII   Facial expression full, symmetric.      CN IX, X   CN IX normal.      CN XI   CN XI normal.      CN XII   CN XII normal.      Motor Exam   Right arm tone: increased/spasticity  Left arm tone: normal  Right leg tone: increased  Left leg tone: normal       Right hemiparesis, unchanged.      Gait, Coordination, and Reflexes      Gait  Gait: (hemiparetic)     Coordination   Finger to nose coordination: normal  Heel to shin coordination: normal   "   Tremor   Intention tremor: absent  Action tremor: absent       Coordination commensurate with strength      Assessment and Plan:  1. Spastic hemiplegic cerebral palsy  2. Partial symptomatic epilepsy with complex partial seizures, intractable, without status epilepticus  -Overall she remains seizure-free and is tolerating Keppra well without any side effects.  I will be changing Keppra to 1000 mg tablets so she will have to take 2 tablets instead of 4.  Otherwise I will see her back in clinic in 1 year for follow-up.    I spent 30 minutes in patient care: Reviewing records prior to the visit, entering orders and documentation and spent more than castillo 50% of this time face-to-face in management, instructions and education regarding above mentioned diagnosis and also on counseling and discussing about taking medication regularly, possible side effects with medication use, importance of good sleep hygiene, good hydration and regular exercise.    Return in about 1 year (around 11/20/2024).       Note to patient: The 21st Century Cures Act makes medical notes like these available to patients in the interest of transparency. However, be advised this is a medical document. It is intended as peer to peer communication. It is written in medical language and may contain abbreviations or verbiage that are unfamiliar. It may appear blunt or direct. Medical documents are intended to carry relevant information, facts as evident, and the clinical opinion of the physician.

## 2024-10-24 RX ORDER — LEVETIRACETAM 500 MG/1
2000 TABLET, FILM COATED, EXTENDED RELEASE ORAL NIGHTLY
Qty: 112 TABLET | Refills: 0 | Status: SHIPPED | OUTPATIENT
Start: 2024-10-24

## 2024-10-24 NOTE — TELEPHONE ENCOUNTER
Rx Refill Note  Requested Prescriptions     Pending Prescriptions Disp Refills    levETIRAcetam XR (KEPPRA XR) 500 MG tablet [Pharmacy Med Name: LEVETIRACETAM  MG  Tablet] 112 tablet 3     Sig: TAKE 4 TABLETS BY MOUTH EVERY NIGHT      Last filled:11.20.23 90 day with 3 pharmacy requesting 28 day supply @ strength of 500mg tabs so 4 nightly instead of 2 sent in fup this month  Last office visit with prescribing clinician: 11/20/2023      Next office visit with prescribing clinician: 11/22/2024     Fina Medrano MA  10/24/24, 12:06 EDT

## 2024-11-07 RX ORDER — LEVETIRACETAM 500 MG/1
2000 TABLET, FILM COATED, EXTENDED RELEASE ORAL NIGHTLY
Qty: 112 TABLET | Refills: 3 | Status: SHIPPED | OUTPATIENT
Start: 2024-11-07

## 2024-11-07 NOTE — TELEPHONE ENCOUNTER
Rx Refill Note  Requested Prescriptions     Pending Prescriptions Disp Refills    levETIRAcetam XR (KEPPRA XR) 500 MG tablet [Pharmacy Med Name: LEVETIRACETAM  MG  Tablet] 112 tablet 3     Sig: TAKE 4 TABLETS BY MOUTH EVERY NIGHT      Last office visit with prescribing clinician: 11/20/2023   Last telemedicine visit with prescribing clinician: Visit date not found   Next office visit with prescribing clinician: 11/22/2024     Yulia Santana MA  11/07/24, 08:48 EST

## 2024-11-22 ENCOUNTER — OFFICE VISIT (OUTPATIENT)
Dept: NEUROLOGY | Facility: CLINIC | Age: 69
End: 2024-11-22
Payer: MEDICARE

## 2024-11-22 VITALS — OXYGEN SATURATION: 97 % | SYSTOLIC BLOOD PRESSURE: 130 MMHG | HEART RATE: 86 BPM | DIASTOLIC BLOOD PRESSURE: 88 MMHG

## 2024-11-22 DIAGNOSIS — G40.219 PARTIAL SYMPTOMATIC EPILEPSY WITH COMPLEX PARTIAL SEIZURES, INTRACTABLE, WITHOUT STATUS EPILEPTICUS: ICD-10-CM

## 2024-11-22 DIAGNOSIS — G80.2 SPASTIC HEMIPLEGIC CEREBRAL PALSY: Primary | ICD-10-CM

## 2024-11-22 PROCEDURE — 99214 OFFICE O/P EST MOD 30 MIN: CPT | Performed by: PSYCHIATRY & NEUROLOGY

## 2024-11-22 PROCEDURE — 1159F MED LIST DOCD IN RCRD: CPT | Performed by: PSYCHIATRY & NEUROLOGY

## 2024-11-22 PROCEDURE — 1160F RVW MEDS BY RX/DR IN RCRD: CPT | Performed by: PSYCHIATRY & NEUROLOGY

## 2024-11-22 RX ORDER — LEVETIRACETAM 500 MG/1
2000 TABLET, FILM COATED, EXTENDED RELEASE ORAL NIGHTLY
Qty: 360 TABLET | Refills: 3 | Status: SHIPPED | OUTPATIENT
Start: 2024-11-22 | End: 2025-02-20

## 2024-11-22 NOTE — PROGRESS NOTES
Subjective:    CC: Eladia Israel is in clinic today for follow up for history of spastic cerebral palsy,    HPI:    Problem history:  Pt followed since , and previously followed by Dr. Mejia Banerjee for complex partial seizure disorder as well as cerebral palsy and  stroke with RHP. She began having seizures around the age of 5 or 6 and has had rare secondarily generalized tonic-clonic seizures. Her partial seizures often begin with a sensation of ringing in her ears and she has had left upper extremity shaking at times with these. One seizure described as her head turning and feeling stiff and her eyes fluttering/rolling.     Over the years she has had flurries of seizures and has had an admission for status epilepticus to [facility] in  when she had several seizures in one day. An EEG at that time showed left hemisphere spike and slow-wave discharges that were frequent. 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     When I met her she had been on brand name Dilantin 130 milligrams in the morning and 200 mg at night and she was on Actonel and calcium. A bone density scan showed worsening osteoporosis, so planned change to Keppra. On dilantin had a seizure about once a year with progression to alt consc and rarely convulsion. Keppra XR gradually increased to 2000 mg hs 2/2 breakthrough seizures.    Some problems over time with sleep maintenance insomnia; taking melatonin.. She also has migraines for which she uses Imitrex.  10/14: doing well. Has gained a few pounds, happy about that. Believes she has occasional seizures, eg one last week. Per caregiver with her, just one past 10 months. Very short, but headache afterwards.  On calcium and vitamin D, and getting IV tx for osteoporosis.    reported: Pt thinks still occasional partial seizures, she describes as brief head  "turns. Caregiver has not seen and is skeptical that these are occurring.   2/17: Still on LVT 1000mg bid. No longer tired with it. Walking 2 miles/day. Has to be careful, occasional falls. General good health, no seizures. On calcium and vitamin D.   3/18: She had done well until 2 weeks ago when she had a \"big\" seizure while in class (no description available). She has no memory of the event but was told about it.  A week ago she believes she had 2 small seizures during which she remained aware, one in the night and 1 in the afternoon, but she did not report these to the medical office.  There was  some question of whether she had medication changes before or after that, but discussed with the nurse practitioner at [facility], and there has been no recent change in her Keppra dose. Blood level was 28, planned change only if further seizures.  Today: no seizures at all. Never misses doses. Recently had bone density scan, has not yet heard results.      Follow-up 2/20/2020:  She is in clinic for regular follow-up.  Since her last visit, she has not had any breakthrough seizures.  She continues to take Keppra  mg tablet, 4 tablets at bedtime.  She is tolerating the medication well and has done excellent with seizure control.    Follow-up: 9/20/2021: She is in clinic for regular follow-up.  Since her last visit in February, she has not had any more breakthrough seizures.  She continues to take Keppra  mg tablet, 4 tablets at bedtime.    Follow-up: 11/17/2022: She is in clinic for regular follow-up.  Since her last visit 1 year ago, she has not had any more breakthrough seizures.  She is tolerating Keppra 2000 mg daily well without any side effects.    Follow-up: 11/20/2023: She is in clinic for regular follow-up.  Since her last visit in November 2022, she reports that she may have had couple of small seizures but is not 100% sure.  She has been taking Keppra  mg, 4 tablets at bedtime.  She " denies any side effects with this dose.    Follow-up: 11/22/2024: She is in clinic for regular follow-up.  Last visit in November 2023, she has remained seizure-free.  She is taking Keppra extended release 5 mg tablet, 4 tablets daily making it a total of 2000 mg daily dose.  She denies any side effects.    The following portions of the patient's history were reviewed and updated as of 11/22/2024: allergies, social history, and problem list.       Current Outpatient Medications:     alendronate (FOSAMAX) 70 MG tablet, Take 1 tablet by mouth Every 7 (Seven) Days., Disp: , Rfl: 11    ALLERGY RELIEF 10 MG tablet, Take 1 tablet by mouth Daily., Disp: , Rfl: 11    Calcium Carbonate-Vitamin D (CALCIUM-VITAMIN D) 500-200 MG-UNIT tablet per tablet, Daily., Disp: , Rfl: 11    Cholecalciferol (VITAMIN D3) 400 units capsule, Daily., Disp: , Rfl: 11    levETIRAcetam XR (KEPPRA XR) 500 MG tablet, Take 4 tablets by mouth Every Night for 90 days., Disp: 360 tablet, Rfl: 3    lisinopril (PRINIVIL,ZESTRIL) 10 MG tablet, Take 1 tablet by mouth Daily., Disp: , Rfl:     TYLENOL 325 MG capsule, As Needed., Disp: , Rfl: 11   Past Medical History:   Diagnosis Date    Seizures       No past surgical history on file.   Family History   Problem Relation Age of Onset    Diabetes Other     Hypertension Other     Stroke Other         Review of Systems  Objective:    /88   Pulse 86   SpO2 97%     Neurology Exam:  Mental Status   Oriented to person, place, and time.   Speech: speech is mildly slurred, unchanged   Level of consciousness: alert     Cranial Nerves      CN III, IV, VI   Extraocular motions are normal.      CN V   Facial sensation intact.      CN VII   Facial expression full, symmetric.      CN IX, X   CN IX normal.      CN XI   CN XI normal.      CN XII   CN XII normal.      Motor Exam   Right arm tone: increased/spasticity  Left arm tone: normal  Right leg tone: increased  Left leg tone: normal       Right hemiparesis,  unchanged.      Gait, Coordination, and Reflexes      Gait  Gait: (hemiparetic)     Coordination   Finger to nose coordination: normal  Heel to shin coordination: normal     Tremor   Intention tremor: absent  Action tremor: absent       Coordination commensurate with strength        Assessment and Plan:  1. Spastic hemiplegic cerebral palsy  2. Partial symptomatic epilepsy with complex partial seizures, intractable, without status epilepticus  Stable without any breakthrough seizures in last 1 year.  She is taking Keppra extended release 500 mg tablet, 4 tablets daily and is tolerating it well without any side effects.  Continue with Keppra as it is and I will see her back in clinic in 1 year for follow-up.       I spent 30 minutes in patient care: Reviewing records prior to the visit, entering orders and documentation and spent more than castillo 50% of this time face-to-face in management, instructions and education regarding above mentioned diagnosis and also on counseling and discussing about taking medication regularly, possible side effects with medication use, importance of good sleep hygiene, good hydration and regular exercise.    Return in about 1 year (around 11/22/2025).       Note to patient: The 21st Century Cures Act makes medical notes like these available to patients in the interest of transparency. However, be advised this is a medical document. It is intended as peer to peer communication. It is written in medical language and may contain abbreviations or verbiage that are unfamiliar. It may appear blunt or direct. Medical documents are intended to carry relevant information, facts as evident, and the clinical opinion of the physician.

## 2025-02-20 RX ORDER — LEVETIRACETAM 500 MG/1
2000 TABLET, FILM COATED, EXTENDED RELEASE ORAL NIGHTLY
Qty: 112 TABLET | Refills: 3 | OUTPATIENT
Start: 2025-02-20

## 2025-02-20 NOTE — TELEPHONE ENCOUNTER
Rx Refill Note  Requested Prescriptions     Pending Prescriptions Disp Refills    levETIRAcetam XR (KEPPRA XR) 500 MG tablet [Pharmacy Med Name: LEVETIRACETAM  MG  Tablet] 112 tablet 3     Sig: TAKE 4 TABLETS BY MOUTH EVERY NIGHT      Last filled:11/22/24 90ds 3 ref  Last office visit with prescribing clinician: 11/22/2024      Next office visit with prescribing clinician: 12/5/2025     Nicky Mason MA  02/20/25, 13:21 EST    Refills on file-sent 11/22/24 90ds with 3 refill-contact pharmacy for refill.

## 2025-03-20 RX ORDER — LEVETIRACETAM 500 MG/1
2000 TABLET, FILM COATED, EXTENDED RELEASE ORAL NIGHTLY
Qty: 112 TABLET | Refills: 3 | OUTPATIENT
Start: 2025-03-20

## 2025-03-20 NOTE — TELEPHONE ENCOUNTER
Rx Refill Note  Requested Prescriptions     Pending Prescriptions Disp Refills    levETIRAcetam XR (KEPPRA XR) 500 MG tablet [Pharmacy Med Name: LEVETIRACETAM  MG  Tablet] 112 tablet 3     Sig: TAKE 4 TABLETS BY MOUTH EVERY NIGHT      Last filled: Rx sent 11/22/24 for 1 year of medicine. Refills on file at pharm    Last office visit with prescribing clinician: 11/22/2024      Next office visit with prescribing clinician: 12/5/2025     Darrel Goldstein MA  03/20/25, 15:22 EDT

## 2025-05-15 RX ORDER — LEVETIRACETAM 500 MG/1
2000 TABLET, FILM COATED, EXTENDED RELEASE ORAL NIGHTLY
Qty: 112 TABLET | Refills: 4 | OUTPATIENT
Start: 2025-05-15

## 2025-05-16 RX ORDER — LEVETIRACETAM 500 MG/1
2000 TABLET, FILM COATED, EXTENDED RELEASE ORAL NIGHTLY
Qty: 360 TABLET | Refills: 2 | Status: SHIPPED | OUTPATIENT
Start: 2025-05-16

## 2025-05-16 NOTE — TELEPHONE ENCOUNTER
Rx Refill Note  Requested Prescriptions     Pending Prescriptions Disp Refills    levETIRAcetam XR (KEPPRA XR) 500 MG tablet [Pharmacy Med Name: LEVETIRACETAM  MG  Tablet] 112 tablet 4     Sig: TAKE 4 TABLETS BY MOUTH EVERY NIGHT      Last filled: 11/22/24 for #360 with 3 refills.   Last office visit with prescribing clinician: 11/22/2024      Next office visit with prescribing clinician: 12/5/2025     Darrel Goldstein MA  05/16/25, 13:29 EDT      Pharmacy messaged in that they received the previous denial stating 1 yr had been sent in the fall but they do not have on record. Resending rx now